# Patient Record
Sex: MALE | Race: WHITE | NOT HISPANIC OR LATINO | Employment: OTHER | URBAN - METROPOLITAN AREA
[De-identification: names, ages, dates, MRNs, and addresses within clinical notes are randomized per-mention and may not be internally consistent; named-entity substitution may affect disease eponyms.]

---

## 2021-08-20 ENCOUNTER — APPOINTMENT (OUTPATIENT)
Dept: GENERAL RADIOLOGY | Facility: CLINIC | Age: 81
End: 2021-08-20
Attending: PHYSICIAN ASSISTANT
Payer: COMMERCIAL

## 2021-08-20 ENCOUNTER — APPOINTMENT (OUTPATIENT)
Dept: CT IMAGING | Facility: CLINIC | Age: 81
End: 2021-08-20
Attending: PHYSICIAN ASSISTANT
Payer: COMMERCIAL

## 2021-08-20 ENCOUNTER — HOSPITAL ENCOUNTER (EMERGENCY)
Facility: CLINIC | Age: 81
Discharge: HOME | End: 2021-08-20
Attending: PHYSICIAN ASSISTANT | Admitting: PHYSICIAN ASSISTANT
Payer: COMMERCIAL

## 2021-08-20 VITALS
SYSTOLIC BLOOD PRESSURE: 136 MMHG | RESPIRATION RATE: 18 BRPM | OXYGEN SATURATION: 99 % | DIASTOLIC BLOOD PRESSURE: 96 MMHG | HEART RATE: 64 BPM | TEMPERATURE: 97.5 F

## 2021-08-20 DIAGNOSIS — M25.512 SHOULDER PAIN, LEFT: ICD-10-CM
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
## 2021-08-20 PROCEDURE — 99284 EMERGENCY DEPT VISIT MOD MDM: CPT | Mod: 25

## 2021-08-20 PROCEDURE — 73010 X-RAY EXAM OF SHOULDER BLADE: CPT | Mod: LT

## 2021-08-20 PROCEDURE — 73030 X-RAY EXAM OF SHOULDER: CPT | Mod: LT

## 2021-08-20 PROCEDURE — 73000 X-RAY EXAM OF COLLAR BONE: CPT | Mod: LT

## 2021-08-20 PROCEDURE — 70450 CT HEAD/BRAIN W/O DYE: CPT

## 2021-08-20 RX ORDER — CYCLOBENZAPRINE HCL 10 MG
5 TABLET ORAL 3 TIMES DAILY PRN
Qty: 9 TABLET | Refills: 0 | Status: SHIPPED | OUTPATIENT
Start: 2021-08-20 | End: 2021-08-26

## 2021-08-20 ASSESSMENT — ENCOUNTER SYMPTOMS
ARTHRALGIAS: 1
NECK PAIN: 0

## 2021-08-20 NOTE — DISCHARGE INSTRUCTIONS
You images were reassuring.  There were no acute findings warranting immediate intervention.  Follow-up with your primary care provider in 2 to 3 days for reassessment.  Return to the emergency department as needed.
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
## 2021-08-20 NOTE — ED NOTES
Bed: ED37  Expected date: 8/20/21  Expected time: 3:05 PM  Means of arrival: Ambulance  Comments:  BV 3 89 M back pain
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
## 2023-04-06 ENCOUNTER — MEDICAL CORRESPONDENCE (OUTPATIENT)
Dept: HEALTH INFORMATION MANAGEMENT | Facility: CLINIC | Age: 83
End: 2023-04-06

## 2023-04-21 RX ORDER — AMLODIPINE BESYLATE 5 MG/1
1 TABLET ORAL DAILY
COMMUNITY

## 2023-04-21 RX ORDER — ASPIRIN 81 MG/1
1 TABLET ORAL DAILY
Status: ON HOLD | COMMUNITY
End: 2023-04-26

## 2023-04-21 RX ORDER — FINASTERIDE 5 MG/1
1 TABLET, FILM COATED ORAL DAILY
COMMUNITY

## 2023-04-21 RX ORDER — LISINOPRIL 40 MG/1
1 TABLET ORAL DAILY
COMMUNITY

## 2023-04-21 RX ORDER — ATORVASTATIN CALCIUM 20 MG/1
1 TABLET, FILM COATED ORAL DAILY
COMMUNITY

## 2023-04-25 ENCOUNTER — HOSPITAL ENCOUNTER (OUTPATIENT)
Facility: CLINIC | Age: 83
Discharge: HOME | End: 2023-04-26
Attending: ORTHOPAEDIC SURGERY | Admitting: ORTHOPAEDIC SURGERY
Payer: COMMERCIAL

## 2023-04-25 ENCOUNTER — ANESTHESIA EVENT (OUTPATIENT)
Dept: SURGERY | Facility: CLINIC | Age: 83
End: 2023-04-25
Payer: COMMERCIAL

## 2023-04-25 ENCOUNTER — APPOINTMENT (OUTPATIENT)
Dept: PHYSICAL THERAPY | Facility: CLINIC | Age: 83
End: 2023-04-25
Attending: ORTHOPAEDIC SURGERY
Payer: COMMERCIAL

## 2023-04-25 ENCOUNTER — ANESTHESIA (OUTPATIENT)
Dept: SURGERY | Facility: CLINIC | Age: 83
End: 2023-04-25
Payer: COMMERCIAL

## 2023-04-25 ENCOUNTER — APPOINTMENT (OUTPATIENT)
Dept: GENERAL RADIOLOGY | Facility: CLINIC | Age: 83
End: 2023-04-25
Attending: ORTHOPAEDIC SURGERY
Payer: COMMERCIAL

## 2023-04-25 DIAGNOSIS — Z96.652 S/P LEFT UNICOMPARTMENTAL KNEE REPLACEMENT: Primary | ICD-10-CM

## 2023-04-25 DIAGNOSIS — M17.12 PRIMARY OSTEOARTHRITIS OF LEFT KNEE: ICD-10-CM

## 2023-04-25 LAB
CREAT SERPL-MCNC: 1.1 MG/DL (ref 0.67–1.17)
GFR SERPL CREATININE-BSD FRML MDRD: 67 ML/MIN/1.73M2
POTASSIUM SERPL-SCNC: 3.9 MMOL/L (ref 3.4–5.3)

## 2023-04-25 PROCEDURE — 999N000063 XR KNEE PORT LEFT 1/2 VIEWS: Mod: LT

## 2023-04-25 PROCEDURE — 710N000009 HC RECOVERY PHASE 1, LEVEL 1, PER MIN: Performed by: ORTHOPAEDIC SURGERY

## 2023-04-25 PROCEDURE — 258N000001 HC RX 258: Performed by: ORTHOPAEDIC SURGERY

## 2023-04-25 PROCEDURE — 82565 ASSAY OF CREATININE: CPT | Performed by: ANESTHESIOLOGY

## 2023-04-25 PROCEDURE — 36415 COLL VENOUS BLD VENIPUNCTURE: CPT | Performed by: ANESTHESIOLOGY

## 2023-04-25 PROCEDURE — 250N000011 HC RX IP 250 OP 636: Performed by: ORTHOPAEDIC SURGERY

## 2023-04-25 PROCEDURE — 250N000009 HC RX 250: Performed by: REGISTERED NURSE

## 2023-04-25 PROCEDURE — 88311 DECALCIFY TISSUE: CPT | Mod: TC | Performed by: ORTHOPAEDIC SURGERY

## 2023-04-25 PROCEDURE — 250N000011 HC RX IP 250 OP 636: Performed by: REGISTERED NURSE

## 2023-04-25 PROCEDURE — 272N000002 HC OR SUPPLY OTHER OPNP: Performed by: ORTHOPAEDIC SURGERY

## 2023-04-25 PROCEDURE — 250N000013 HC RX MED GY IP 250 OP 250 PS 637: Performed by: ORTHOPAEDIC SURGERY

## 2023-04-25 PROCEDURE — 250N000009 HC RX 250: Performed by: SURGERY

## 2023-04-25 PROCEDURE — C1713 ANCHOR/SCREW BN/BN,TIS/BN: HCPCS | Performed by: ORTHOPAEDIC SURGERY

## 2023-04-25 PROCEDURE — C1776 JOINT DEVICE (IMPLANTABLE): HCPCS | Performed by: ORTHOPAEDIC SURGERY

## 2023-04-25 PROCEDURE — 370N000017 HC ANESTHESIA TECHNICAL FEE, PER MIN: Performed by: ORTHOPAEDIC SURGERY

## 2023-04-25 PROCEDURE — 360N000077 HC SURGERY LEVEL 4, PER MIN: Performed by: ORTHOPAEDIC SURGERY

## 2023-04-25 PROCEDURE — 250N000011 HC RX IP 250 OP 636: Performed by: SURGERY

## 2023-04-25 PROCEDURE — 272N000001 HC OR GENERAL SUPPLY STERILE: Performed by: ORTHOPAEDIC SURGERY

## 2023-04-25 PROCEDURE — 258N000003 HC RX IP 258 OP 636: Performed by: ANESTHESIOLOGY

## 2023-04-25 PROCEDURE — 97161 PT EVAL LOW COMPLEX 20 MIN: CPT | Mod: GP

## 2023-04-25 PROCEDURE — 999N000141 HC STATISTIC PRE-PROCEDURE NURSING ASSESSMENT: Performed by: ORTHOPAEDIC SURGERY

## 2023-04-25 PROCEDURE — 84132 ASSAY OF SERUM POTASSIUM: CPT | Performed by: ANESTHESIOLOGY

## 2023-04-25 PROCEDURE — 97530 THERAPEUTIC ACTIVITIES: CPT | Mod: GP

## 2023-04-25 PROCEDURE — 97116 GAIT TRAINING THERAPY: CPT | Mod: GP

## 2023-04-25 PROCEDURE — 258N000003 HC RX IP 258 OP 636: Performed by: REGISTERED NURSE

## 2023-04-25 PROCEDURE — 250N000009 HC RX 250: Performed by: ORTHOPAEDIC SURGERY

## 2023-04-25 DEVICE — SCREW TEMP PSN ZIM FEMALE 2.5MM 42-5099-025-25: Type: IMPLANTABLE DEVICE | Site: KNEE | Status: FUNCTIONAL

## 2023-04-25 DEVICE — IMPLANTABLE DEVICE
Type: IMPLANTABLE DEVICE | Site: KNEE | Status: FUNCTIONAL
Brand: PERSONA® PARTIAL KNEE SYSTEM

## 2023-04-25 DEVICE — SCREW GD 48MM ZUK HEX HD MIS STRL LF: Type: IMPLANTABLE DEVICE | Site: KNEE | Status: FUNCTIONAL

## 2023-04-25 DEVICE — BONE CEMENT SIMPLEX FULL DOSE 6191-1-001: Type: IMPLANTABLE DEVICE | Site: KNEE | Status: FUNCTIONAL

## 2023-04-25 DEVICE — IMPLANTABLE DEVICE: Type: IMPLANTABLE DEVICE | Site: KNEE | Status: FUNCTIONAL

## 2023-04-25 RX ORDER — FENTANYL CITRATE 50 UG/ML
25 INJECTION, SOLUTION INTRAMUSCULAR; INTRAVENOUS EVERY 5 MIN PRN
Status: DISCONTINUED | OUTPATIENT
Start: 2023-04-25 | End: 2023-04-25 | Stop reason: HOSPADM

## 2023-04-25 RX ORDER — AMOXICILLIN 250 MG
1 CAPSULE ORAL 2 TIMES DAILY
Status: DISCONTINUED | OUTPATIENT
Start: 2023-04-25 | End: 2023-04-26 | Stop reason: HOSPADM

## 2023-04-25 RX ORDER — ONDANSETRON 4 MG/1
4 TABLET, ORALLY DISINTEGRATING ORAL EVERY 30 MIN PRN
Status: DISCONTINUED | OUTPATIENT
Start: 2023-04-25 | End: 2023-04-25 | Stop reason: HOSPADM

## 2023-04-25 RX ORDER — OXYCODONE HYDROCHLORIDE 5 MG/1
5 TABLET ORAL EVERY 4 HOURS PRN
Status: DISCONTINUED | OUTPATIENT
Start: 2023-04-25 | End: 2023-04-26 | Stop reason: HOSPADM

## 2023-04-25 RX ORDER — HYDROMORPHONE HCL IN WATER/PF 6 MG/30 ML
0.2 PATIENT CONTROLLED ANALGESIA SYRINGE INTRAVENOUS EVERY 5 MIN PRN
Status: DISCONTINUED | OUTPATIENT
Start: 2023-04-25 | End: 2023-04-25 | Stop reason: HOSPADM

## 2023-04-25 RX ORDER — SODIUM CHLORIDE, SODIUM LACTATE, POTASSIUM CHLORIDE, CALCIUM CHLORIDE 600; 310; 30; 20 MG/100ML; MG/100ML; MG/100ML; MG/100ML
INJECTION, SOLUTION INTRAVENOUS CONTINUOUS
Status: DISCONTINUED | OUTPATIENT
Start: 2023-04-25 | End: 2023-04-25 | Stop reason: HOSPADM

## 2023-04-25 RX ORDER — PROPOFOL 10 MG/ML
INJECTION, EMULSION INTRAVENOUS PRN
Status: DISCONTINUED | OUTPATIENT
Start: 2023-04-25 | End: 2023-04-25

## 2023-04-25 RX ORDER — PROPOFOL 10 MG/ML
INJECTION, EMULSION INTRAVENOUS CONTINUOUS PRN
Status: DISCONTINUED | OUTPATIENT
Start: 2023-04-25 | End: 2023-04-25

## 2023-04-25 RX ORDER — DEXAMETHASONE SODIUM PHOSPHATE 4 MG/ML
INJECTION, SOLUTION INTRA-ARTICULAR; INTRALESIONAL; INTRAMUSCULAR; INTRAVENOUS; SOFT TISSUE PRN
Status: DISCONTINUED | OUTPATIENT
Start: 2023-04-25 | End: 2023-04-25

## 2023-04-25 RX ORDER — ONDANSETRON 4 MG/1
4 TABLET, ORALLY DISINTEGRATING ORAL EVERY 6 HOURS PRN
Status: DISCONTINUED | OUTPATIENT
Start: 2023-04-25 | End: 2023-04-26 | Stop reason: HOSPADM

## 2023-04-25 RX ORDER — SODIUM CHLORIDE, SODIUM LACTATE, POTASSIUM CHLORIDE, CALCIUM CHLORIDE 600; 310; 30; 20 MG/100ML; MG/100ML; MG/100ML; MG/100ML
INJECTION, SOLUTION INTRAVENOUS CONTINUOUS
Status: DISCONTINUED | OUTPATIENT
Start: 2023-04-25 | End: 2023-04-26 | Stop reason: HOSPADM

## 2023-04-25 RX ORDER — ONDANSETRON 2 MG/ML
4 INJECTION INTRAMUSCULAR; INTRAVENOUS EVERY 6 HOURS PRN
Status: DISCONTINUED | OUTPATIENT
Start: 2023-04-25 | End: 2023-04-26 | Stop reason: HOSPADM

## 2023-04-25 RX ORDER — NALOXONE HYDROCHLORIDE 0.4 MG/ML
0.2 INJECTION, SOLUTION INTRAMUSCULAR; INTRAVENOUS; SUBCUTANEOUS
Status: DISCONTINUED | OUTPATIENT
Start: 2023-04-25 | End: 2023-04-26 | Stop reason: HOSPADM

## 2023-04-25 RX ORDER — POLYETHYLENE GLYCOL 3350 17 G/17G
17 POWDER, FOR SOLUTION ORAL DAILY
Status: DISCONTINUED | OUTPATIENT
Start: 2023-04-26 | End: 2023-04-26 | Stop reason: HOSPADM

## 2023-04-25 RX ORDER — ONDANSETRON 2 MG/ML
4 INJECTION INTRAMUSCULAR; INTRAVENOUS EVERY 30 MIN PRN
Status: DISCONTINUED | OUTPATIENT
Start: 2023-04-25 | End: 2023-04-25 | Stop reason: HOSPADM

## 2023-04-25 RX ORDER — CEFAZOLIN SODIUM/WATER 2 G/20 ML
2 SYRINGE (ML) INTRAVENOUS SEE ADMIN INSTRUCTIONS
Status: DISCONTINUED | OUTPATIENT
Start: 2023-04-25 | End: 2023-04-25 | Stop reason: HOSPADM

## 2023-04-25 RX ORDER — HYDROXYZINE HYDROCHLORIDE 10 MG/1
10 TABLET, FILM COATED ORAL EVERY 6 HOURS PRN
Qty: 30 TABLET | Refills: 0 | Status: SHIPPED | OUTPATIENT
Start: 2023-04-25

## 2023-04-25 RX ORDER — NALOXONE HYDROCHLORIDE 0.4 MG/ML
0.4 INJECTION, SOLUTION INTRAMUSCULAR; INTRAVENOUS; SUBCUTANEOUS
Status: DISCONTINUED | OUTPATIENT
Start: 2023-04-25 | End: 2023-04-26 | Stop reason: HOSPADM

## 2023-04-25 RX ORDER — CEFAZOLIN SODIUM 1 G/3ML
1 INJECTION, POWDER, FOR SOLUTION INTRAMUSCULAR; INTRAVENOUS EVERY 8 HOURS
Status: COMPLETED | OUTPATIENT
Start: 2023-04-25 | End: 2023-04-26

## 2023-04-25 RX ORDER — ACETAMINOPHEN 325 MG/1
975 TABLET ORAL EVERY 8 HOURS
Status: DISCONTINUED | OUTPATIENT
Start: 2023-04-25 | End: 2023-04-26 | Stop reason: HOSPADM

## 2023-04-25 RX ORDER — LIDOCAINE 40 MG/G
CREAM TOPICAL
Status: DISCONTINUED | OUTPATIENT
Start: 2023-04-25 | End: 2023-04-25 | Stop reason: HOSPADM

## 2023-04-25 RX ORDER — DIPHENHYDRAMINE HCL 12.5MG/5ML
12.5 LIQUID (ML) ORAL EVERY 6 HOURS PRN
Status: DISCONTINUED | OUTPATIENT
Start: 2023-04-25 | End: 2023-04-26 | Stop reason: HOSPADM

## 2023-04-25 RX ORDER — TRANEXAMIC ACID 650 MG/1
1950 TABLET ORAL ONCE
Status: COMPLETED | OUTPATIENT
Start: 2023-04-25 | End: 2023-04-25

## 2023-04-25 RX ORDER — HYDROXYZINE HYDROCHLORIDE 10 MG/1
10 TABLET, FILM COATED ORAL EVERY 6 HOURS PRN
Status: DISCONTINUED | OUTPATIENT
Start: 2023-04-25 | End: 2023-04-26 | Stop reason: HOSPADM

## 2023-04-25 RX ORDER — LIDOCAINE HYDROCHLORIDE 20 MG/ML
INJECTION, SOLUTION INFILTRATION; PERINEURAL PRN
Status: DISCONTINUED | OUTPATIENT
Start: 2023-04-25 | End: 2023-04-25

## 2023-04-25 RX ORDER — TERAZOSIN 10 MG/1
10 CAPSULE ORAL AT BEDTIME
COMMUNITY

## 2023-04-25 RX ORDER — ACETAMINOPHEN 325 MG/1
650 TABLET ORAL EVERY 4 HOURS PRN
Qty: 100 TABLET | Refills: 0 | Status: SHIPPED | OUTPATIENT
Start: 2023-04-25

## 2023-04-25 RX ORDER — HYDROMORPHONE HCL IN WATER/PF 6 MG/30 ML
0.2 PATIENT CONTROLLED ANALGESIA SYRINGE INTRAVENOUS
Status: DISCONTINUED | OUTPATIENT
Start: 2023-04-25 | End: 2023-04-26 | Stop reason: HOSPADM

## 2023-04-25 RX ORDER — METHOCARBAMOL 500 MG/1
500 TABLET, FILM COATED ORAL EVERY 6 HOURS PRN
Status: DISCONTINUED | OUTPATIENT
Start: 2023-04-25 | End: 2023-04-26 | Stop reason: HOSPADM

## 2023-04-25 RX ORDER — BISACODYL 10 MG
10 SUPPOSITORY, RECTAL RECTAL DAILY PRN
Status: DISCONTINUED | OUTPATIENT
Start: 2023-04-25 | End: 2023-04-26 | Stop reason: HOSPADM

## 2023-04-25 RX ORDER — VASOPRESSIN IN 0.9 % NACL 2 UNIT/2ML
SYRINGE (ML) INTRAVENOUS PRN
Status: DISCONTINUED | OUTPATIENT
Start: 2023-04-25 | End: 2023-04-25

## 2023-04-25 RX ORDER — BUPIVACAINE HYDROCHLORIDE 7.5 MG/ML
INJECTION, SOLUTION INTRASPINAL
Status: COMPLETED | OUTPATIENT
Start: 2023-04-25 | End: 2023-04-25

## 2023-04-25 RX ORDER — HYDROMORPHONE HCL IN WATER/PF 6 MG/30 ML
0.4 PATIENT CONTROLLED ANALGESIA SYRINGE INTRAVENOUS
Status: DISCONTINUED | OUTPATIENT
Start: 2023-04-25 | End: 2023-04-26 | Stop reason: HOSPADM

## 2023-04-25 RX ORDER — HYDROMORPHONE HCL IN WATER/PF 6 MG/30 ML
0.4 PATIENT CONTROLLED ANALGESIA SYRINGE INTRAVENOUS EVERY 5 MIN PRN
Status: DISCONTINUED | OUTPATIENT
Start: 2023-04-25 | End: 2023-04-25 | Stop reason: HOSPADM

## 2023-04-25 RX ORDER — OXYCODONE HYDROCHLORIDE 5 MG/1
5-10 TABLET ORAL EVERY 4 HOURS PRN
Qty: 25 TABLET | Refills: 0 | Status: SHIPPED | OUTPATIENT
Start: 2023-04-25

## 2023-04-25 RX ORDER — LIDOCAINE 40 MG/G
CREAM TOPICAL
Status: DISCONTINUED | OUTPATIENT
Start: 2023-04-25 | End: 2023-04-26 | Stop reason: HOSPADM

## 2023-04-25 RX ORDER — ONDANSETRON 2 MG/ML
INJECTION INTRAMUSCULAR; INTRAVENOUS PRN
Status: DISCONTINUED | OUTPATIENT
Start: 2023-04-25 | End: 2023-04-25

## 2023-04-25 RX ORDER — ACETAMINOPHEN 325 MG/1
650 TABLET ORAL EVERY 4 HOURS PRN
Status: DISCONTINUED | OUTPATIENT
Start: 2023-04-28 | End: 2023-04-26 | Stop reason: HOSPADM

## 2023-04-25 RX ORDER — AMOXICILLIN 250 MG
1-2 CAPSULE ORAL 2 TIMES DAILY
Qty: 30 TABLET | Refills: 0 | Status: SHIPPED | OUTPATIENT
Start: 2023-04-25

## 2023-04-25 RX ORDER — PROCHLORPERAZINE MALEATE 5 MG
5 TABLET ORAL EVERY 6 HOURS PRN
Status: DISCONTINUED | OUTPATIENT
Start: 2023-04-25 | End: 2023-04-26 | Stop reason: HOSPADM

## 2023-04-25 RX ORDER — OXYCODONE HYDROCHLORIDE 5 MG/1
10 TABLET ORAL EVERY 4 HOURS PRN
Status: DISCONTINUED | OUTPATIENT
Start: 2023-04-25 | End: 2023-04-26 | Stop reason: HOSPADM

## 2023-04-25 RX ORDER — FENTANYL CITRATE 50 UG/ML
50 INJECTION, SOLUTION INTRAMUSCULAR; INTRAVENOUS EVERY 5 MIN PRN
Status: DISCONTINUED | OUTPATIENT
Start: 2023-04-25 | End: 2023-04-25 | Stop reason: HOSPADM

## 2023-04-25 RX ORDER — CEFAZOLIN SODIUM/WATER 2 G/20 ML
2 SYRINGE (ML) INTRAVENOUS
Status: COMPLETED | OUTPATIENT
Start: 2023-04-25 | End: 2023-04-25

## 2023-04-25 RX ADMIN — ROPIVACAINE HYDROCHLORIDE 15 ML: 5 INJECTION, SOLUTION EPIDURAL; INFILTRATION; PERINEURAL at 11:40

## 2023-04-25 RX ADMIN — ACETAMINOPHEN 975 MG: 325 TABLET ORAL at 17:30

## 2023-04-25 RX ADMIN — PHENYLEPHRINE HYDROCHLORIDE 100 MCG: 10 INJECTION INTRAVENOUS at 13:40

## 2023-04-25 RX ADMIN — PHENYLEPHRINE HYDROCHLORIDE 100 MCG: 10 INJECTION INTRAVENOUS at 13:55

## 2023-04-25 RX ADMIN — PHENYLEPHRINE HYDROCHLORIDE 100 MCG: 10 INJECTION INTRAVENOUS at 13:43

## 2023-04-25 RX ADMIN — TRANEXAMIC ACID 1950 MG: 650 TABLET ORAL at 10:39

## 2023-04-25 RX ADMIN — PROPOFOL 150 MCG/KG/MIN: 10 INJECTION, EMULSION INTRAVENOUS at 12:53

## 2023-04-25 RX ADMIN — BUPIVACAINE HYDROCHLORIDE IN DEXTROSE 1.6 ML: 7.5 INJECTION, SOLUTION SUBARACHNOID at 12:52

## 2023-04-25 RX ADMIN — PHENYLEPHRINE HYDROCHLORIDE 100 MCG: 10 INJECTION INTRAVENOUS at 13:34

## 2023-04-25 RX ADMIN — PHENYLEPHRINE HYDROCHLORIDE 0.25 MCG/KG/MIN: 10 INJECTION INTRAVENOUS at 13:30

## 2023-04-25 RX ADMIN — SODIUM CHLORIDE, POTASSIUM CHLORIDE, SODIUM LACTATE AND CALCIUM CHLORIDE: 600; 310; 30; 20 INJECTION, SOLUTION INTRAVENOUS at 14:16

## 2023-04-25 RX ADMIN — PHENYLEPHRINE HYDROCHLORIDE 100 MCG: 10 INJECTION INTRAVENOUS at 13:31

## 2023-04-25 RX ADMIN — PROPOFOL 20 MG: 10 INJECTION, EMULSION INTRAVENOUS at 12:50

## 2023-04-25 RX ADMIN — Medication 2 G: at 12:42

## 2023-04-25 RX ADMIN — ONDANSETRON 4 MG: 2 INJECTION INTRAMUSCULAR; INTRAVENOUS at 14:25

## 2023-04-25 RX ADMIN — DEXAMETHASONE SODIUM PHOSPHATE 10 MG: 4 INJECTION, SOLUTION INTRA-ARTICULAR; INTRALESIONAL; INTRAMUSCULAR; INTRAVENOUS; SOFT TISSUE at 12:53

## 2023-04-25 RX ADMIN — LIDOCAINE HYDROCHLORIDE 60 MG: 20 INJECTION, SOLUTION INFILTRATION; PERINEURAL at 12:53

## 2023-04-25 RX ADMIN — PHENYLEPHRINE HYDROCHLORIDE 100 MCG: 10 INJECTION INTRAVENOUS at 13:52

## 2023-04-25 RX ADMIN — ASPIRIN 325 MG: 325 TABLET, COATED ORAL at 17:30

## 2023-04-25 RX ADMIN — ACETAMINOPHEN 975 MG: 325 TABLET ORAL at 23:59

## 2023-04-25 RX ADMIN — Medication 0.5 UNITS: at 14:00

## 2023-04-25 RX ADMIN — SODIUM CHLORIDE, POTASSIUM CHLORIDE, SODIUM LACTATE AND CALCIUM CHLORIDE: 600; 310; 30; 20 INJECTION, SOLUTION INTRAVENOUS at 10:39

## 2023-04-25 RX ADMIN — PHENYLEPHRINE HYDROCHLORIDE 100 MCG: 10 INJECTION INTRAVENOUS at 13:48

## 2023-04-25 RX ADMIN — PHENYLEPHRINE HYDROCHLORIDE 100 MCG: 10 INJECTION INTRAVENOUS at 13:28

## 2023-04-25 RX ADMIN — PHENYLEPHRINE HYDROCHLORIDE 100 MCG: 10 INJECTION INTRAVENOUS at 13:37

## 2023-04-25 RX ADMIN — SENNOSIDES AND DOCUSATE SODIUM 1 TABLET: 50; 8.6 TABLET ORAL at 20:05

## 2023-04-25 RX ADMIN — CEFAZOLIN 1 G: 1 INJECTION, POWDER, FOR SOLUTION INTRAMUSCULAR; INTRAVENOUS at 20:05

## 2023-04-25 RX ADMIN — MIDAZOLAM 2 MG: 1 INJECTION INTRAMUSCULAR; INTRAVENOUS at 11:30

## 2023-04-25 ASSESSMENT — ACTIVITIES OF DAILY LIVING (ADL)
WALKING_OR_CLIMBING_STAIRS_DIFFICULTY: NO
ADLS_ACUITY_SCORE: 18
ADLS_ACUITY_SCORE: 35
ADLS_ACUITY_SCORE: 18
TOILETING_ISSUES: NO
DOING_ERRANDS_INDEPENDENTLY_DIFFICULTY: NO
ADLS_ACUITY_SCORE: 18
HEARING_DIFFICULTY_OR_DEAF: NO
DIFFICULTY_EATING/SWALLOWING: NO
ADLS_ACUITY_SCORE: 18
DRESSING/BATHING_DIFFICULTY: NO
WEAR_GLASSES_OR_BLIND: NO
FALL_HISTORY_WITHIN_LAST_SIX_MONTHS: NO
ADLS_ACUITY_SCORE: 18
CONCENTRATING,_REMEMBERING_OR_MAKING_DECISIONS_DIFFICULTY: NO
DIFFICULTY_COMMUNICATING: NO
ADLS_ACUITY_SCORE: 18
CHANGE_IN_FUNCTIONAL_STATUS_SINCE_ONSET_OF_CURRENT_ILLNESS/INJURY: NO

## 2023-04-25 ASSESSMENT — ENCOUNTER SYMPTOMS: DYSRHYTHMIAS: 0

## 2023-04-25 ASSESSMENT — LIFESTYLE VARIABLES: TOBACCO_USE: 1

## 2023-04-25 NOTE — ANESTHESIA PROCEDURE NOTES
"Intrathecal Procedure Note    Pre-Procedure   Staff -        Anesthesiologist:  Óscar Ceballos MD       Performed By: anesthesiologist       Location: OR       Pre-Anesthestic Checklist: patient identified, IV checked, risks and benefits discussed, informed consent, monitors and equipment checked, pre-op evaluation and at physician/surgeon's request  Timeout:       Correct Patient: Yes        Correct Procedure: Yes        Correct Site: Yes        Correct Position: Yes   Procedure Documentation  Procedure: intrathecal       Patient Position: sitting       Patient Prep/Sterile Barriers: sterile gloves, mask, patient draped       Skin prep: Betadine       Insertion Site: L2-3. (midline approach).       Needle Gauge: 24.        Needle Length (Inches): 3.5        Spinal Needle Type: Shiloh-Christopher       Introducer used       Introducer: 20 G       # of attempts: 1 and  # of redirects:          : 0.    Assessment/Narrative         Paresthesias: No.       CSF fluid: clear.       Opening pressure was cmH2O while  Sitting.      Medication(s) Administered   0.75% Hyperbaric Bupivacaine (Intrathecal) - Intrathecal   1.6 mL - 4/25/2023 12:52:00 PM   Comments:  Patient tolerated the procedure well and without complications.  Patient laid flat immediately.      FOR [facility] ([facility]) ONLY:   Pain Team Contact information: please page the Pain Team Via [employer]. Search \"Pain\". During daytime hours, please page the attending first. At night please page the resident first.      "

## 2023-04-25 NOTE — PROGRESS NOTES
04/25/23 1647   Appointment Info   Signing Clinician's Name / Credentials (PT) Augustin Bowman DPT   Quick Adds   Quick Adds Certification   Living Environment   People in Home spouse   Current Living Arrangements house  (townhouse)   Home Accessibility stairs within home   Number of Stairs, Within Home, Primary greater than 10 stairs   Stair Railings, Within Home, Primary railings safe and in good condition   Transportation Anticipated car, drives self   Living Environment Comments Pt reports that he lives in a townhouse w/ his spouse. Notes level entry w/ 14 stairs to bedroom (landing in middle of stairs w/ first rail on one side and second on other side).\   Self-Care   Usual Activity Tolerance good   Current Activity Tolerance moderate   Equipment Currently Used at Home cane, straight   Fall history within last six months no   Activity/Exercise/Self-Care Comment Reports that he is independent w/ ADLs and mobility w/o AD. Used a SEC at times when knee pain was really bad. Will be barrowing both a FWW and 4WW   General Information   Onset of Illness/Injury or Date of Surgery 04/25/23   Referring Physician Maximus Cortes MD   Patient/Family Therapy Goals Statement (PT) Return to home   Pertinent History of Current Problem (include personal factors and/or comorbidities that impact the POC) POD #0 L Unicompartmental knee arthroplasty   Existing Precautions/Restrictions fall   Weight-Bearing Status - LLE weight-bearing as tolerated   Cognition   Affect/Mental Status (Cognition) WFL   Orientation Status (Cognition) oriented x 4   Follows Commands (Cognition) WFL   Range of Motion (ROM)   ROM Comment  L knee AROm   Strength (Manual Muscle Testing)   Strength (Manual Muscle Testing) Able to perform L SLR   Bed Mobility   Comment, (Bed Mobility) Supine to sit w/ SBA   Transfers   Comment, (Transfers) Sit to stand w/ CGA and FWW   Gait/Stairs (Locomotion)   Comment, (Gait/Stairs) 10 ft w/ CGA and FWW   Balance    Balance Comments No overt LOB noted   Clinical Impression   Criteria for Skilled Therapeutic Intervention Yes, treatment indicated   PT Diagnosis (PT) Impaired ambulation   Influenced by the following impairments Impaired balance, strength and activity tolerance   Functional limitations due to impairments Impaired functional mobility, ADLs and IADLs   Clinical Presentation (PT Evaluation Complexity) Stable/Uncomplicated   Clinical Presentation Rationale Clinical judgment   Clinical Decision Making (Complexity) low complexity   Planned Therapy Interventions (PT) balance training;bed mobility training;gait training;patient/family education;stair training;strengthening;transfer training;home exercise program   Risk & Benefits of therapy have been explained evaluation/treatment results reviewed;care plan/treatment goals reviewed;risks/benefits reviewed;current/potential barriers reviewed;participants voiced agreement with care plan;participants included;patient   PT Total Evaluation Time   PT Eval, Low Complexity Minutes (31325) 10   Therapy Certification   Start of care date 04/25/23   Certification date from 04/25/23   Certification date to 04/28/23   Medical Diagnosis Unicompartmental knee arthroplasty on L   Physical Therapy Goals   PT Frequency 2x/day   PT Predicted Duration/Target Date for Goal Attainment 04/28/23   PT Goals Bed Mobility;Transfers;Gait;Stairs   PT: Bed Mobility Independent;Supine to/from sit   PT: Transfers Modified independent;Sit to/from stand   PT: Gait Modified independent;150 feet;Rolling walker   PT: Stairs Greater than 10 stairs;Rail on right;Modified independent   Interventions   Interventions Quick Adds Therapeutic Activity;Therapeutic Procedure;Gait Training   Therapeutic Procedure/Exercise   Ther. Procedure: strength, endurance, ROM, flexibillity Minutes (07025) 8   Symptoms Noted During/After Treatment none   Treatment Detail/Skilled Intervention Pt completing supine exercises to  improve upon functional strengthening. Pt completing supine ankle pumps, quad sets, glute sets, heel slides, SAQ and SLR x5 reps. Handout provided.   Therapeutic Activity   Therapeutic Activities: dynamic activities to improve functional performance Minutes (64530) 12   Symptoms Noted During/After Treatment None   Treatment Detail/Skilled Intervention Pt supine in bed at start of session. Pt agreeable to working with therapy. Pt educated on WBAT status and no need for a brace. Pt completing sit <> stand x1 from chair height w/ FWW and SBA. Noting Pt needing to go to bathroom to urinate, however, Pt began urinating on floor w/o knowing Pt was urinating. Had Pt complete stand <> sit w/ FWW and SBA to clean up urine. After cleaning urine up had Pt transfer over to toilet, however, Pt continued to urinate all the way to toilet and in toilet for awhile. Assisted w/ cleaning up urine again. Pt able to transfer off of toilet w/ FWW and SBA. Frequently leaving FWW of to side for stand to sit transfers and needing verbal cues to keep closer to body. Pt seated in bedside chair at end of session w/ call light in place, chair alarm on and all needs met. RN notified.   Gait Training   Gait Training Minutes (64865) 10   Symptoms Noted During/After Treatment (Gait Training) none   Treatment Detail/Skilled Intervention Pt ambulating 175 ft w/ FWW and CGA progressing to SBA. Pt needing verbal cues for upright posture at times and forward gaze. Pt w/ good weight shifting and no overt LOB noted. Good pierre throughout.   PT Discharge Planning   PT Plan Stairs, ambulation distance, review HEP, transfers and bed mobility   PT Discharge Recommendation (DC Rec)   (Defer to ortho)   PT Rationale for DC Rec Pt below baseline mobility but moving well. Pt normally independent w/ mobility w/o AD. Currently Pt SBA x1 w/ all mobility. Anticipate when medically ready Pt will be able to DC home w/ family assist as needed and start OP PT.   PT  Brief overview of current status SBA x1 w/ FWW for mobility   Total Session Time   Timed Code Treatment Minutes 30   Total Session Time (sum of timed and untimed services) 40   [facility]  OUTPATIENT PHYSICAL THERAPY EVALUATION  PLAN OF TREATMENT FOR OUTPATIENT REHABILITATION  (COMPLETE FOR INITIAL CLAIMS ONLY)  Patient's Last Name, First Name, M.I.  YOB: 1940  Thai Mcneilton  YOUNG                        Provider's Name  [facility] Medical Record No.  [id number]                             Onset Date:  04/25/23   Start of Care Date:  04/25/23   Type:     _X_PT   ___OT   ___SLP Medical Diagnosis:  Unicompartmental knee arthroplasty on L              PT Diagnosis:  Impaired ambulation Visits from SOC:  1     See note for plan of treatment, functional goals and certification details    I CERTIFY THE NEED FOR THESE SERVICES FURNISHED UNDER        THIS PLAN OF TREATMENT AND WHILE UNDER MY CARE     (Physician co-signature of this document indicates review and certification of the therapy plan).

## 2023-04-25 NOTE — ANESTHESIA PROCEDURE NOTES
Femoral (via adductor canal) Procedure Note    Pre-Procedure   Staff -        Anesthesiologist:  Óscar Ceballos MD       Performed By: anesthesiologist       Location: pre-op       Pre-Anesthestic Checklist: patient identified, IV checked, site marked, risks and benefits discussed, informed consent, monitors and equipment checked, pre-op evaluation, at physician/surgeon's request and post-op pain management  Timeout:       Correct Patient: Yes        Correct Procedure: Yes        Correct Site: Yes        Correct Position: Yes        Correct Laterality: Yes        Site Marked: Yes  Procedure Documentation  Procedure: Femoral (via adductor canal)       Patient Position: supine       Patient Prep/Sterile Barriers: sterile gloves, mask, patient draped       Skin prep: Chloraprep       Local skin infiltrated with 3 mL of 1% lidocaine.        Needle Type: insulated       Needle Gauge: 21.        Needle Length (Inches): 3.5        Ultrasound guided       1. Ultrasound was used to identify targeted nerve, plexus, vascular marker, or fascial plane and place a needle adjacent to it in real-time.       2. Ultrasound was used to visualize the spread of anesthetic in close proximity to the above referenced structure.       3. A permanent image is entered into the patient's record.       4. The visualized anatomic structures appeared normal.       5. There were no apparent abnormal pathologic findings.    Assessment/Narrative         The placement was negative for: blood aspirated, painful injection and site bleeding       Paresthesias: No.       Bolus given via needle..        Secured via.        Insertion/Infusion Method: Single Shot       Complications: none       Injection made incrementally with aspirations every 5 mL.    Medication(s) Administered   Ropivacaine 0.5% w/ 1:400K Epi (Injection) - Injection   15 mL - 4/25/2023 11:40:00 AM   Comments:  The surgeon has given a verbal order transferring care of this patient  "to me for the performance of a regional analgesia block for post-op pain control. It is requested of me because I am uniquely trained and qualified to perform this block and the surgeon is neither trained nor qualified to perform this procedure.          FOR [facility] ([facility]) ONLY:   Pain Team Contact information: please page the Pain Team Via [employer] Search \"Pain\". During daytime hours, please page the attending first. At night please page the resident first.      "

## 2023-04-25 NOTE — PROGRESS NOTES
PTA medications updated by Medication Scribe prior to surgery via phone call with patient (last doses completed by Nurse)     Medication history sources: Patient, Surescripts, H&P and Patient's home med list  In the past week, patient estimated taking medication this percent of the time: Greater than 90%      Significant changes made to the medication list:  None      Additional medication history information:   None    Medication reconciliation completed by provider prior to medication history? No    Time spent in this activity: 25 minutes    The information provided in this note is only as accurate as the sources available at the time of update(s)    Prior to Admission medications    Medication Sig Last Dose Taking? Auth Provider Long Term End Date   amLODIPine (NORVASC) 5 MG tablet Take 1 tablet by mouth daily 4/25/2023 at am Yes Reported, Patient Yes    aspirin 81 MG EC tablet Take 1 tablet by mouth daily Past Week at am Yes Reported, Patient     atorvastatin (LIPITOR) 20 MG tablet Take 1 tablet by mouth daily 4/24/2023 at am Yes Reported, Patient Yes    lisinopril (ZESTRIL) 40 MG tablet Take 1 tablet by mouth daily 4/24/2023 at am Yes Reported, Patient Yes    omeprazole (PRILOSEC) 20 MG DR capsule Take 1 capsule by mouth daily 4/25/2023 at am Yes Reported, Patient     terazosin (HYTRIN) 10 MG capsule Take 10 mg by mouth At Bedtime 4/24/2023 at am Yes Reported, Patient Yes    finasteride (PROSCAR) 5 MG tablet Take 1 tablet by mouth daily  at unknown  Reported, Patient       Medication history completed by:    Chauncey Gabriel CPhT  Medication Scribe  [facility]

## 2023-04-25 NOTE — INTERVAL H&P NOTE
"I have reviewed the surgical (or preoperative) H&P that is linked to this encounter, and examined the patient. There are no significant changes    Clinical Conditions Present on Arrival:  Clinically Significant Risk Factors Present on Admission                  # Obesity: Estimated body mass index is 31.06 kg/m  as calculated from the following:    Height as of this encounter: 1.803 m (5' 11\").    Weight as of this encounter: 101 kg (222 lb 11.2 oz).       "

## 2023-04-25 NOTE — ANESTHESIA PREPROCEDURE EVALUATION
Anesthesia Pre-Procedure Evaluation    Patient: Héctor Mcneil   MRN: [id number] : 1940        Procedure : Procedure(s):  LEFT UNICOMPARTMENTAL KNEE ARTHROPLASTY          History reviewed. No pertinent past medical history.   History reviewed. No pertinent surgical history.   No Known Allergies   Social History     Tobacco Use     Smoking status: Former     Types: Cigarettes     Quit date:      Years since quittin.3     Smokeless tobacco: Never   Vaping Use     Vaping status: Not on file   Substance Use Topics     Alcohol use: Yes     Comment: occassionally      Wt Readings from Last 1 Encounters:   No data found for Wt        Anesthesia Evaluation            ROS/MED HX  ENT/Pulmonary:     (+) tobacco use, Past use,     Neurologic: Comment: H/o cerebral ischemia  Back pain  Essential tremor      Cardiovascular:     (+) Dyslipidemia hypertension----- (-) ASCENCIO and arrhythmias   METS/Exercise Tolerance: >4 METS    Hematologic:       Musculoskeletal:   (+) arthritis,     GI/Hepatic: Comment: Colon polyps    (+) GERD,     Renal/Genitourinary:     (+) BPH,     Endo:       Psychiatric/Substance Use:       Infectious Disease:       Malignancy:       Other:            Physical Exam    Airway        Mallampati: I   TM distance: > 3 FB   Neck ROM: full   Mouth opening: > 3 cm    Respiratory Devices and Support         Dental       (+) Minor Abnormalities - some fillings, tiny chips      Cardiovascular   cardiovascular exam normal          Pulmonary   pulmonary exam normal                OUTSIDE LABS:  CBC: No results found for: WBC, HGB, HCT, PLT  BMP: No results found for: NA, POTASSIUM, CHLORIDE, CO2, BUN, CR, GLC  COAGS: No results found for: PTT, INR, FIBR  POC: No results found for: BGM, HCG, HCGS  HEPATIC: No results found for: ALBUMIN, PROTTOTAL, ALT, AST, GGT, ALKPHOS, BILITOTAL, BILIDIRECT, TIMI  OTHER: No results found for: PH, LACT, A1C, NICA, PHOS, MAG, LIPASE, AMYLASE, TSH, T4, T3, CRP,  SED    Anesthesia Plan    ASA Status:  2   NPO Status:  NPO Appropriate    Anesthesia Type: Spinal.              Consents    Anesthesia Plan(s) and associated risks, benefits, and realistic alternatives discussed. Questions answered and patient/representative(s) expressed understanding.     - Discussed: Risks, Benefits and Alternatives for the PROCEDURE were discussed     - Discussed with:  Patient         Postoperative Care    Pain management: IV analgesics, Peripheral nerve block (Single Shot), Multi-modal analgesia.   PONV prophylaxis: Ondansetron (or other 5HT-3), Background Propofol Infusion, Dexamethasone or Solumedrol     Comments:                Óscar Ceballos MD

## 2023-04-25 NOTE — BRIEF OP NOTE
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  Post-operative diagnosis Same as pre-operative diagnosis    Procedure: Procedure(s):  LEFT UNICOMPARTMENTAL KNEE ARTHROPLASTY  Surgeon: Surgeon(s) and Role:     * Maximus Cortes MD - Primary  Anesthesia: General   Estimated Blood Loss: Less than 50 ml    Drains: None  Specimens:   ID Type Source Tests Collected by Time Destination   1 : MEDIAL FEMORAL CONDYAL  Tissue Knee, Left SURGICAL PATHOLOGY EXAM Maximus Cortes MD 4/25/2023  1:32 PM      Findings:   Small area of collapse of the subchondral bone with an area of marrow change consistent with avascular necrosis of the medial femoral condyle.  Complications: None.  Implants:   Implant Name Type Inv. Item Serial No.  Lot No. LRB No. Used Action   BONE CEMENT SIMPLEX FULL DOSE 6191-1-010 - [id number] Cement, Bone BONE CEMENT SIMPLEX FULL DOSE 6191-1-010  [employer] [id number] Left 1 Implanted   CMPNT FEM 4 KN LT MDL PRT PERSONA STRL -620-01 - [id number] Total Joint Component/Insert CMPNT FEM 4 KN LT MDL PRT PERSONA STRL -166-01  [employer] [id number] Left 1 Implanted   BSPLT TIB PERSONA F KN LT MDL TIV STRL -780-01 - [id number] Total Joint Component/Insert BSPLT TIB PERSONA F KN LT MDL TIV STRL -541-01  [employer] [id number] Left 1 Implanted   PERSONA PARTIAL KNEE SYSTEM THCIKNESS 8MM LEFT SIZE F  Total Joint Component/Insert   [employer] [id number] Left 1 Implanted

## 2023-04-25 NOTE — ANESTHESIA CARE TRANSFER NOTE
Patient: Héctor Mcneil    Procedure: Procedure(s):  LEFT UNICOMPARTMENTAL KNEE ARTHROPLASTY       Diagnosis: Primary osteoarthritis of left knee [M17.12]  Diagnosis Additional Information: No value filed.    Anesthesia Type:   Spinal     Note:    Oropharynx: oropharynx clear of all foreign objects  Level of Consciousness: drowsy  Oxygen Supplementation: face mask  Level of Supplemental Oxygen (L/min / FiO2): 6  Independent Airway: airway patency satisfactory and stable  Dentition: dentition unchanged  Vital Signs Stable: post-procedure vital signs reviewed and stable  Report to RN Given: handoff report given  Patient transferred to: PACU  Comments: Patient comfortable  Handoff Report: Identifed the Patient, Identified the Reponsible Provider, Reviewed the pertinent medical history, Discussed the surgical course, Reviewed Intra-OP anesthesia mangement and issues during anesthesia, Set expectations for post-procedure period and Allowed opportunity for questions and acknowledgement of understanding      Vitals:  Vitals Value Taken Time   BP     Temp     Pulse 77 04/25/23 1510   Resp 9 04/25/23 1510   SpO2 97 % 04/25/23 1510   Vitals shown include unvalidated device data.    Electronically Signed By: DAVIN Alvarado CRNA  April 25, 2023  3:11 PM

## 2023-04-25 NOTE — ANESTHESIA POSTPROCEDURE EVALUATION
Patient: Héctor Mcneil    Procedure: Procedure(s):  LEFT UNICOMPARTMENTAL KNEE ARTHROPLASTY       Anesthesia Type:  Spinal    Note:  Disposition: Inpatient; Admission   Postop Pain Control: Uneventful            Sign Out: Well controlled pain   PONV: No   Neuro/Psych: Uneventful            Sign Out: Acceptable/Baseline neuro status   Airway/Respiratory: Uneventful            Sign Out: Acceptable/Baseline resp. status   CV/Hemodynamics: Uneventful            Sign Out: Acceptable CV status; No obvious hypovolemia; No obvious fluid overload   Other NRE: NONE   DID A NON-ROUTINE EVENT OCCUR? No           Last vitals:  Vitals Value Taken Time   /79 04/25/23 1600   Temp 36  C (96.8  F) 04/25/23 1553   Pulse 71 04/25/23 1606   Resp 15 04/25/23 1606   SpO2 95 % 04/25/23 1607   Vitals shown include unvalidated device data.    Electronically Signed By: Abida Melgar MD  April 25, 2023  5:34 PM

## 2023-04-25 NOTE — OP NOTE
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    PROCEDURE:  Left knee unicompartmental arthroplasty.    SURGEON:  Maximus Cortes MD    FIRST ASSISTANT:  ARLENE Akers    INDICATIONS:  The patient is an 83-year-old gentleman who has been having trouble with knee pain over the last year and a half or so.  He did have an MRI scan at the onset of his symptoms that showed a medial meniscus tear, and despite conservative treatment for this, he ultimately underwent an arthroscopic procedure for that.  At the time of the procedure, he was found to have an area of intact cartilage over his medial femoral condyle, but it was quite soft and worrisome for an underlying avascular necrosis.  He never really got good pain relief following his arthroscopic procedure and in subsequent x-rays, it did show an area of sclerosis over his medial femoral condyle consistent with avascular necrosis.  Because of ongoing symptoms that were significantly limiting his activities, again, despite conservative treatment, we decided to proceed with a left knee unicompartmental arthroplasty.  The risks, benefits, and expected postoperative course were discussed with the patient and his wife.  They understood and wished to proceed.    PROCEDURE IN DETAIL:  The patient was brought to the operating room and given a left adductor canal block.  His left knee and left lower extremity was then prepped and draped in sterile fashion.  His left lower extremity was exsanguinated.  The tourniquet was inflated.  An incision was made over the anterior aspect of the knee.  This was carried down to subcutaneous tissues down to the fascia, and a mid vastus approach was taken in the knee joint, allowing the patella to be subluxed laterally.  The knee was inspected.  There was evidence of a partial  medial meniscectomy.  Otherwise, the articular cartilage throughout the knee looked intact, although there was, again, the area of soft cartilage over the medial femoral condyle.  There was discoloration of the bone marrow in the area of concern in the medial femoral condyle, and we did send the cut surface of the femur to pathology for confirmation that this represented an area of avascular necrosis. We started with the tibial jig for the Persona unicompartmental knee arthroplasty. The tibia was cut to accept a size F Persona medial tibial unicompartmental component.  We then cut the femur to accept a size 4 Persona femoral component.  We trialed this.  It appeared a bit tight in extension compared to flexion, and so we filed down the distal and anterior chamfers on the femur a mm or so, and this seemed to make a big difference.  At this point, he was balanced in both flexion and extension, and he was not too tight in either.  The trial components were removed.  The knee was thoroughly irrigated, and the real components were cemented into place.  While the cement was hardening, we irrigated the knee with a dilute solution of Betadine, which was allowed to sit within the knee for 3 minutes and was thoroughly irrigated from the knee with a liter of normal saline.  Once the cement had hardened, all extraneous cement was removed. We then trialed the knee, and it appeared that an 8 mm insert gave the best fit.  A real 8 mm tibial polyethylene insert was snapped into the tibial component.  The knee had full range of motion.  The soft tissue tension appeared satisfactory.  The wound was then irrigated once again with saline.  The fascia was closed with interrupted 0 Vicryl sutures.  The skin was closed with 2-0 Vicryl subcutaneous sutures and staples.  A sterile compressive dressing was applied to the knee.  The tourniquet was deflated.  The patient was then awakened from anesthesia and transferred to postanesthesia  recovery in satisfactory condition.    It should be noted that my assistant was necessary throughout the entire procedure to assist with retraction and positioning.    Maximus Cortes MD        D: 2023   T: 2023   MT: [email or web address]    Name:     ARIEL ACEVEDOJagdish  MRN:      [id number]        Account:        [id number]   :      1940           Procedure Date: 2023     Document: [id number]

## 2023-04-26 ENCOUNTER — APPOINTMENT (OUTPATIENT)
Dept: PHYSICAL THERAPY | Facility: CLINIC | Age: 83
End: 2023-04-26
Attending: ORTHOPAEDIC SURGERY
Payer: COMMERCIAL

## 2023-04-26 ENCOUNTER — APPOINTMENT (OUTPATIENT)
Dept: OCCUPATIONAL THERAPY | Facility: CLINIC | Age: 83
End: 2023-04-26
Attending: ORTHOPAEDIC SURGERY
Payer: COMMERCIAL

## 2023-04-26 VITALS
HEART RATE: 94 BPM | DIASTOLIC BLOOD PRESSURE: 78 MMHG | OXYGEN SATURATION: 99 % | RESPIRATION RATE: 18 BRPM | SYSTOLIC BLOOD PRESSURE: 139 MMHG | BODY MASS INDEX: 31.18 KG/M2 | HEIGHT: 71 IN | TEMPERATURE: 97.7 F | WEIGHT: 222.7 LBS

## 2023-04-26 LAB
GLUCOSE BLDC GLUCOMTR-MCNC: 117 MG/DL (ref 70–99)
HGB BLD-MCNC: 13 G/DL (ref 13.3–17.7)

## 2023-04-26 PROCEDURE — 85018 HEMOGLOBIN: CPT | Performed by: ORTHOPAEDIC SURGERY

## 2023-04-26 PROCEDURE — 82962 GLUCOSE BLOOD TEST: CPT

## 2023-04-26 PROCEDURE — 97116 GAIT TRAINING THERAPY: CPT | Mod: GP | Performed by: PHYSICAL THERAPIST

## 2023-04-26 PROCEDURE — 97110 THERAPEUTIC EXERCISES: CPT | Mod: GP | Performed by: PHYSICAL THERAPIST

## 2023-04-26 PROCEDURE — 97165 OT EVAL LOW COMPLEX 30 MIN: CPT | Mod: GO | Performed by: OCCUPATIONAL THERAPIST

## 2023-04-26 PROCEDURE — 250N000011 HC RX IP 250 OP 636: Performed by: ORTHOPAEDIC SURGERY

## 2023-04-26 PROCEDURE — 250N000013 HC RX MED GY IP 250 OP 250 PS 637: Performed by: ORTHOPAEDIC SURGERY

## 2023-04-26 PROCEDURE — 97535 SELF CARE MNGMENT TRAINING: CPT | Mod: GO | Performed by: OCCUPATIONAL THERAPIST

## 2023-04-26 PROCEDURE — 36415 COLL VENOUS BLD VENIPUNCTURE: CPT | Performed by: ORTHOPAEDIC SURGERY

## 2023-04-26 RX ORDER — LISINOPRIL 40 MG/1
40 TABLET ORAL DAILY
Status: DISCONTINUED | OUTPATIENT
Start: 2023-04-26 | End: 2023-04-26 | Stop reason: HOSPADM

## 2023-04-26 RX ORDER — TERAZOSIN 10 MG/1
10 CAPSULE ORAL AT BEDTIME
Status: DISCONTINUED | OUTPATIENT
Start: 2023-04-26 | End: 2023-04-26 | Stop reason: HOSPADM

## 2023-04-26 RX ORDER — ATORVASTATIN CALCIUM 20 MG/1
20 TABLET, FILM COATED ORAL DAILY
Status: DISCONTINUED | OUTPATIENT
Start: 2023-04-26 | End: 2023-04-26 | Stop reason: HOSPADM

## 2023-04-26 RX ORDER — AMLODIPINE BESYLATE 5 MG/1
5 TABLET ORAL DAILY
Status: DISCONTINUED | OUTPATIENT
Start: 2023-04-26 | End: 2023-04-26 | Stop reason: HOSPADM

## 2023-04-26 RX ORDER — PANTOPRAZOLE SODIUM 40 MG/1
40 TABLET, DELAYED RELEASE ORAL DAILY
Status: DISCONTINUED | OUTPATIENT
Start: 2023-04-26 | End: 2023-04-26 | Stop reason: HOSPADM

## 2023-04-26 RX ORDER — FINASTERIDE 5 MG/1
5 TABLET, FILM COATED ORAL DAILY
Status: DISCONTINUED | OUTPATIENT
Start: 2023-04-26 | End: 2023-04-26 | Stop reason: HOSPADM

## 2023-04-26 RX ADMIN — CEFAZOLIN 1 G: 1 INJECTION, POWDER, FOR SOLUTION INTRAMUSCULAR; INTRAVENOUS at 05:35

## 2023-04-26 RX ADMIN — ACETAMINOPHEN 975 MG: 325 TABLET ORAL at 09:13

## 2023-04-26 RX ADMIN — ASPIRIN 325 MG: 325 TABLET, COATED ORAL at 09:15

## 2023-04-26 RX ADMIN — SENNOSIDES AND DOCUSATE SODIUM 1 TABLET: 50; 8.6 TABLET ORAL at 09:16

## 2023-04-26 RX ADMIN — LISINOPRIL 40 MG: 40 TABLET ORAL at 09:17

## 2023-04-26 ASSESSMENT — ACTIVITIES OF DAILY LIVING (ADL)
ADLS_ACUITY_SCORE: 18
PREVIOUS_RESPONSIBILITIES: MEAL PREP;HOUSEKEEPING;LAUNDRY;SHOPPING;MEDICATION MANAGEMENT;YARDWORK;FINANCES;DRIVING

## 2023-04-26 NOTE — PROGRESS NOTES
Patient vital signs are at baseline: Yes  Patient able to ambulate as they were prior to admission or with assist devices provided by therapies during their stay:  Yes  Patient MUST void prior to discharge:  Yes  Patient able to tolerate oral intake:  Yes  Pain has adequate pain control using Oral analgesics:  Yes  Does patient have an identified :  Yes  Has goal D/C date and time been discussed with patient:  Yes,tomorrow.    A/O x4. VSS on RA. Up with 1/wlk/gb to BR. Denies pain, N/V. CMS intact. LR infusing @ 125ml/hr, now SL. L knee dressing intact. Small bruise and skin tear on L elbow. Observation pt, may discharge tomorrow.

## 2023-04-26 NOTE — PLAN OF CARE
Physical Therapy Discharge Summary    Reason for therapy discharge:    Discharged to home with outpatient therapy. Reports OP PT not yet set up, but plan to call and get set this week.    Progress towards therapy goal(s). See goals on Care Plan in [facility] electronic health record for goal details.  Goals met    Therapy recommendation(s):    Continued therapy is recommended.  Rationale/Recommendations:  Pt will benefit from further gait training with transition to cane and then no AD for optimal gait mechanics and safety.

## 2023-04-26 NOTE — PROGRESS NOTES
Patient vital signs are at baseline: Yes  Patient able to ambulate as they were prior to admission or with assist devices provided by therapies during their stay:  Yes  Patient MUST void prior to discharge: Yes  Patient able to tolerate oral intake:  Yes  Pain has adequate pain control using Oral analgesics:  Yes  Does patient have an identified : Yes  Has goal D/C date and time been discussed with patient:  TBD  Pt is alert and oriented x 4, PIV SL, VSS on RA, SBA with walker and gait belt. Deneied having any pain this shift. Schedule tylenol was given and one dose of ABX was given. CMS intact, Dressing CDI.

## 2023-04-26 NOTE — PROGRESS NOTES
04/26/23 0900   Appointment Info   Signing Clinician's Name / Credentials (OT) Lisseth Jessica, OTRL   Living Environment   People in Home spouse   Current Living Arrangements house   Home Accessibility stairs within home   Number of Stairs, Within Home, Primary greater than 10 stairs   Stair Railings, Within Home, Primary railings safe and in good condition   Transportation Anticipated car, drives self   Living Environment Comments Pt reports that he lives in a townhouse w/ his spouse. Notes level entry w/ 14 stairs to bedroom (landing in middle of stairs w/ first rail on one side and second on other side). Pt has a tub shower with grab bar, standard toilets   Self-Care   Usual Activity Tolerance good   Current Activity Tolerance moderate   Equipment Currently Used at Home cane, straight   Fall history within last six months no   Activity/Exercise/Self-Care Comment Pt is independent with ADLs and mobility at baseline   Instrumental Activities of Daily Living (IADL)   Previous Responsibilities meal prep;housekeeping;laundry;shopping;medication management;yardwork;finances;driving   IADL Comments Pt spouse able to assist as necessary   General Information   Onset of Illness/Injury or Date of Surgery 04/25/23   Referring Physician Maximus Cortes MD   Patient/Family Therapy Goal Statement (OT) Pt would like to return home and to active lifestyle   Additional Occupational Profile Info/Pertinent History of Current Problem LTKA on 4/25/23   Existing Precautions/Restrictions fall;weight bearing   Left Lower Extremity (Weight-bearing Status) weight-bearing as tolerated (WBAT)   Cognitive Status Examination   Orientation Status orientation to person, place and time   Affect/Mental Status (Cognitive) WFL   Follows Commands WFL   Cognitive Status Comments Pt appears cognitively intact with good recall of education   Sensory   Sensory Comments Intact   Pain Assessment   Patient Currently in Pain Yes, see Vital Sign  flowsheet   Posture   Posture not impaired   Range of Motion Comprehensive   Comment, General Range of Motion mild LLE impairments, RLE and BUE WFL   Strength Comprehensive (MMT)   Comment, General Manual Muscle Testing (MMT) Assessment Mild LLE impairments, RLE and BUE WFL   Bed Mobility   Comment (Bed Mobility) SBA   Transfers   Transfer Comments CGA   Balance   Balance Comments Intact with walker   Activities of Daily Living   BADL Assessment/Intervention bathing;lower body dressing;toileting   Bathing Assessment/Intervention   Comment, (Bathing) Min assist   Lower Body Dressing Assessment/Training   Comment, (Lower Body Dressing) Min assist   Toileting   Comment, (Toileting) SBA   Clinical Impression   Criteria for Skilled Therapeutic Interventions Met (OT) Yes, treatment indicated   OT Diagnosis Decline in ADL independence and safety   Influenced by the following impairments KA   OT Problem List-Impairments impacting ADL problems related to;range of motion (ROM);strength;pain;post-surgical precautions   Assessment of Occupational Performance 1-3 Performance Deficits   Identified Performance Deficits Dressing and bathing impairment   Planned Therapy Interventions (OT) ADL retraining   Clinical Decision Making Complexity (OT) low complexity   Anticipated Equipment Needs Upon Discharge (OT)   (N/A)   Risk & Benefits of therapy have been explained evaluation/treatment results reviewed;care plan/treatment goals reviewed;risks/benefits reviewed;current/potential barriers reviewed;participants voiced agreement with care plan;participants included;patient;spouse/significant other   OT Total Evaluation Time   OT Eval, Low Complexity Minutes (15523) 8   OT Goals   Therapy Frequency (OT) One time eval and treatment   OT Predicted Duration/Target Date for Goal Attainment 04/26/23   OT Goals Lower Body Dressing;Lower Body Bathing   OT: Lower Body Dressing Modified independent;within precautions;including set-up/clothing  retrieval;Goal Met   OT: Lower Body Bathing Modified independent;using adaptive equipment;with precautions;Goal Met   Self-Care/Home Management   Self-Care/Home Mgmt/ADL, Compensatory, Meal Prep Minutes (36139) 23   Symptoms Noted During/After Treatment (Meal Preparation/Planning Training) increased pain   Treatment Detail/Skilled Intervention OT: Pt agreeable to therapy. Pt educated on LE dressing techniques to improve ADL indpeendence and safety. Pt able to don pants, underwear and socks EOB with SBA and cues after education. Pt educated on technique for tub transfer, after education pt completed simulated tub transfer with sBA and grab bar. Pt and caregiver educated on fall reduction techinques to improve ADL indpendence, both voiced understnading. No further OT needs at this time   OT Discharge Planning   OT Plan DC   OT Discharge Recommendation (DC Rec)   (Defer to ortho MD)   OT Rationale for DC Rec Patient appears safe and able to discharge home with intermittent assist from spouse for higher level IADLs   OT Brief overview of current status SBA tub transfer   Total Session Time   Timed Code Treatment Minutes 23   Total Session Time (sum of timed and untimed services) 31    [facility]  OUTPATIENT OCCUPATIONAL THERAPY  EVALUATION  PLAN OF TREATMENT FOR OUTPATIENT REHABILITATION  (COMPLETE FOR INITIAL CLAIMS ONLY)  Patient's Last Name, First Name, M.I.  YOB: 1940  Héctor Mcneil                          Provider's Name  [facility] Medical Record No.  [id number]                             Onset Date:  04/25/23   Start of Care Date:      Type:     ___PT   _X_OT   ___SLP Medical Diagnosis:                       OT Diagnosis:  Decline in ADL independence and safety Visits from SOC:  1     See note for plan of treatment, functional goals and certification details    I CERTIFY THE NEED FOR THESE SERVICES FURNISHED UNDER        THIS  PLAN OF TREATMENT AND WHILE UNDER MY CARE     (Physician co-signature of this document indicates review and certification of the therapy plan).

## 2023-04-26 NOTE — PROGRESS NOTES
"POD# 1    SUBJECTIVE  Denies pain    OBJECTIVE:   /77 (BP Location: Left arm, Patient Position: Semi-Madrigal's)   Pulse 80   Temp 98.3  F (36.8  C) (Oral)   Resp 16   Ht 1.803 m (5' 11\")   Wt 101 kg (222 lb 11.2 oz)   SpO2 94%   BMI 31.06 kg/m     Hemoglobin   Date Value Ref Range Status   04/26/2023 13.0 (L) 13.3 - 17.7 g/dL Final   ]   Wound: dressing dry      ASSESSMENT   Doing well    PLAN   Mobilize in PT  Home today   Outpatient PT  Follow up 2 weeks postop    Maximus Cortes MD   "

## 2023-04-26 NOTE — PLAN OF CARE
Patient vital signs are at baseline: Yes  Patient able to ambulate as they were prior to admission or with assist devices provided by therapies during their stay:  Yes  Patient MUST void prior to discharge:  Yes  Patient able to tolerate oral intake:  Yes  Pain has adequate pain control using Oral analgesics:  Yes  Does patient have an identified :  Yes  Has goal D/C date and time been discussed with patient:  Yes, Home 11:10 AM     Patient A/Ox4, VSS,RA. Incision CDI, CMS intact. Denies significant pain.     Educated regarding incision care,  medication & pain management, and follow up.     Discharge to home with family

## 2023-04-26 NOTE — PLAN OF CARE
Occupational Therapy Discharge Summary    Reason for therapy discharge:    All goals and outcomes met, no further needs identified.    Progress towards therapy goal(s). See goals on Care Plan in [facility] electronic health record for goal details.  Goals met    Therapy recommendation(s):    Defer to ortho MD for further therapy recommendations

## 2023-04-27 LAB
PATH REPORT.COMMENTS IMP SPEC: NORMAL
PATH REPORT.COMMENTS IMP SPEC: NORMAL
PATH REPORT.FINAL DX SPEC: NORMAL
PATH REPORT.GROSS SPEC: NORMAL
PATH REPORT.MICROSCOPIC SPEC OTHER STN: NORMAL
PATH REPORT.RELEVANT HX SPEC: NORMAL
PHOTO IMAGE: NORMAL

## 2023-04-27 PROCEDURE — 88304 TISSUE EXAM BY PATHOLOGIST: CPT | Mod: 26 | Performed by: PATHOLOGY

## 2023-04-27 PROCEDURE — 88311 DECALCIFY TISSUE: CPT | Mod: 26 | Performed by: PATHOLOGY

## 2023-05-05 ENCOUNTER — DOCUMENTATION ONLY (OUTPATIENT)
Dept: OTHER | Facility: CLINIC | Age: 83
End: 2023-05-05
Payer: COMMERCIAL

## (undated) DEVICE — MANIFOLD NEPTUNE 4 PORT 700-20

## (undated) DEVICE — BONE CLEANING TIP INTERPULSE  0210-010-000

## (undated) DEVICE — BLADE SAW RECIP STRK 70X12.5X0.80MM 0277-096-277

## (undated) DEVICE — CAST PADDING 6" STERILE 9046S

## (undated) DEVICE — LINEN TOWEL PACK X5 5464

## (undated) DEVICE — GLOVE BIOGEL PI MICRO INDICATOR UNDERGLOVE SZ 7.5 48975

## (undated) DEVICE — TIP INSRTR PERSONA BRNG PRT KN SYS DISP

## (undated) DEVICE — SUCTION IRR SYSTEM W/O TIP INTERPULSE HANDPIECE 0210-100-000

## (undated) DEVICE — NDL SPINAL 18GA 3.5" 405184

## (undated) DEVICE — SU VICRYL 2-0 CT-1 27" UND J259H

## (undated) DEVICE — BONE CEMENT MIXEVAC III HI VAC KIT  0206-015-000

## (undated) DEVICE — DRSG AQUACEL AG 3.5X6.0" HYDROFIBER 412010

## (undated) DEVICE — DRSG AQUACEL AG 3.5X9.75" HYDROFIBER 412011

## (undated) DEVICE — Device

## (undated) DEVICE — ESU GROUND PAD UNIVERSAL W/O CORD

## (undated) DEVICE — SU VICRYL 0 CT-1 CR 8X18" J740D

## (undated) DEVICE — GLOVE BIOGEL PI SZ 7.5 40875

## (undated) DEVICE — PACK TOTAL KNEE SOP15TKFSD

## (undated) DEVICE — SOL NACL 0.9% IRRIG 3000ML BAG 2B7477

## (undated) DEVICE — BLADE SAW SAGITTAL STRK 18X90X1.27MM HD SYS 6 6118-127-090

## (undated) DEVICE — DRILL PIN HEADLESS TROCAR 00-5901-020-00

## (undated) DEVICE — DECANTER BAG 2002S

## (undated) DEVICE — DRAPE SHEET REV FOLD 3/4 9349

## (undated) DEVICE — SYR 50ML LL W/O NDL 309653

## (undated) DEVICE — SOLUTION WOUND CLEANSING 3/4OZ 10% PVP EA-L3011FB-50

## (undated) DEVICE — BLADE CLIPPER 4412A

## (undated) DEVICE — WRAP EZY KNEE

## (undated) DEVICE — DRSG ADAPTIC 3X8" 6113

## (undated) DEVICE — SOL WATER IRRIG 1000ML BOTTLE 2F7114

## (undated) DEVICE — BLADE SAW SAGITTAL STRK MED WIDE 25X73X0.89MM 2108-105-000

## (undated) DEVICE — SOL NACL 0.9% IRRIG 1000ML BOTTLE 2F7124

## (undated) DEVICE — DRSG AQUACEL AG HYDROFIBER  3.5X10" 422605

## (undated) DEVICE — STPL SKIN 35W 6.9MM  PXW35

## (undated) DEVICE — GLOVE BIOGEL PI ORTHOPRO SZ 7.5 47675

## (undated) RX ORDER — PROPOFOL 10 MG/ML
INJECTION, EMULSION INTRAVENOUS
Status: DISPENSED
Start: 2023-04-25

## (undated) RX ORDER — TRANEXAMIC ACID 650 MG/1
TABLET ORAL
Status: DISPENSED
Start: 2023-04-25

## (undated) RX ORDER — CEFAZOLIN SODIUM/WATER 2 G/20 ML
SYRINGE (ML) INTRAVENOUS
Status: DISPENSED
Start: 2023-04-25

## (undated) RX ORDER — DEXMEDETOMIDINE HYDROCHLORIDE 4 UG/ML
INJECTION, SOLUTION INTRAVENOUS
Status: DISPENSED
Start: 2023-04-25

## (undated) RX ORDER — FENTANYL CITRATE 0.05 MG/ML
INJECTION, SOLUTION INTRAMUSCULAR; INTRAVENOUS
Status: DISPENSED
Start: 2023-04-25

## (undated) RX ORDER — LIDOCAINE HCL/EPINEPHRINE/PF 2%-1:200K
VIAL (ML) INJECTION
Status: DISPENSED
Start: 2023-04-23

## (undated) RX ORDER — LIDOCAINE HYDROCHLORIDE 20 MG/ML
JELLY TOPICAL
Status: DISPENSED
Start: 2023-04-25

## (undated) RX ORDER — DEXAMETHASONE SODIUM PHOSPHATE 4 MG/ML
INJECTION, SOLUTION INTRA-ARTICULAR; INTRALESIONAL; INTRAMUSCULAR; INTRAVENOUS; SOFT TISSUE
Status: DISPENSED
Start: 2023-04-25

## (undated) RX ORDER — ONDANSETRON 2 MG/ML
INJECTION INTRAMUSCULAR; INTRAVENOUS
Status: DISPENSED
Start: 2023-04-25